# Patient Record
Sex: MALE | Race: BLACK OR AFRICAN AMERICAN | NOT HISPANIC OR LATINO | Employment: UNEMPLOYED | URBAN - METROPOLITAN AREA
[De-identification: names, ages, dates, MRNs, and addresses within clinical notes are randomized per-mention and may not be internally consistent; named-entity substitution may affect disease eponyms.]

---

## 2023-06-03 ENCOUNTER — HOSPITAL ENCOUNTER (EMERGENCY)
Facility: HOSPITAL | Age: 33
Discharge: HOME | End: 2023-06-03
Attending: EMERGENCY MEDICINE

## 2023-06-03 VITALS
SYSTOLIC BLOOD PRESSURE: 126 MMHG | OXYGEN SATURATION: 97 % | TEMPERATURE: 97.5 F | DIASTOLIC BLOOD PRESSURE: 65 MMHG | HEART RATE: 78 BPM | RESPIRATION RATE: 20 BRPM | WEIGHT: 211 LBS

## 2023-06-03 DIAGNOSIS — E11.65 TYPE 2 DIABETES MELLITUS WITH HYPERGLYCEMIA (HCC): Primary | ICD-10-CM

## 2023-06-03 LAB
ALBUMIN SERPL BCP-MCNC: 4.4 G/DL (ref 3.5–5)
ALP SERPL-CCNC: 62 U/L (ref 34–104)
ALT SERPL W P-5'-P-CCNC: 14 U/L (ref 7–52)
ANION GAP SERPL CALCULATED.3IONS-SCNC: 7 MMOL/L (ref 4–13)
ANISOCYTOSIS BLD QL SMEAR: PRESENT
AST SERPL W P-5'-P-CCNC: 14 U/L (ref 13–39)
BACTERIA UR QL AUTO: NORMAL /HPF
BASOPHILS # BLD MANUAL: 0 THOUSAND/UL (ref 0–0.1)
BASOPHILS NFR MAR MANUAL: 0 % (ref 0–1)
BILIRUB SERPL-MCNC: 0.31 MG/DL (ref 0.2–1)
BILIRUB UR QL STRIP: NEGATIVE
BUN SERPL-MCNC: 11 MG/DL (ref 5–25)
CALCIUM SERPL-MCNC: 9.6 MG/DL (ref 8.4–10.2)
CHLORIDE SERPL-SCNC: 98 MMOL/L (ref 96–108)
CLARITY UR: CLEAR
CO2 SERPL-SCNC: 25 MMOL/L (ref 21–32)
COLOR UR: COLORLESS
CREAT SERPL-MCNC: 0.92 MG/DL (ref 0.6–1.3)
EOSINOPHIL # BLD MANUAL: 0.1 THOUSAND/UL (ref 0–0.4)
EOSINOPHIL NFR BLD MANUAL: 2 % (ref 0–6)
ERYTHROCYTE [DISTWIDTH] IN BLOOD BY AUTOMATED COUNT: 12.5 % (ref 11.6–15.1)
GFR SERPL CREATININE-BSD FRML MDRD: 109 ML/MIN/1.73SQ M
GLUCOSE SERPL-MCNC: 361 MG/DL (ref 65–140)
GLUCOSE SERPL-MCNC: 373 MG/DL (ref 65–140)
GLUCOSE UR STRIP-MCNC: ABNORMAL MG/DL
HCT VFR BLD AUTO: 45.2 % (ref 36.5–49.3)
HGB BLD-MCNC: 14.7 G/DL (ref 12–17)
HGB UR QL STRIP.AUTO: NEGATIVE
KETONES UR STRIP-MCNC: NEGATIVE MG/DL
LEUKOCYTE ESTERASE UR QL STRIP: ABNORMAL
LYMPHOCYTES # BLD AUTO: 2.12 THOUSAND/UL (ref 0.6–4.47)
LYMPHOCYTES # BLD AUTO: 42 % (ref 14–44)
MCH RBC QN AUTO: 26.5 PG (ref 26.8–34.3)
MCHC RBC AUTO-ENTMCNC: 32.5 G/DL (ref 31.4–37.4)
MCV RBC AUTO: 81 FL (ref 82–98)
MONOCYTES # BLD AUTO: 0.15 THOUSAND/UL (ref 0–1.22)
MONOCYTES NFR BLD: 3 % (ref 4–12)
NEUTROPHILS # BLD MANUAL: 2.68 THOUSAND/UL (ref 1.85–7.62)
NEUTS SEG NFR BLD AUTO: 53 % (ref 43–75)
NITRITE UR QL STRIP: NEGATIVE
NON-SQ EPI CELLS URNS QL MICRO: NORMAL /HPF
PH UR STRIP.AUTO: 6 [PH]
PLATELET # BLD AUTO: 233 THOUSANDS/UL (ref 149–390)
PLATELET BLD QL SMEAR: ADEQUATE
PMV BLD AUTO: 10.6 FL (ref 8.9–12.7)
POIKILOCYTOSIS BLD QL SMEAR: PRESENT
POTASSIUM SERPL-SCNC: 3.8 MMOL/L (ref 3.5–5.3)
PROT SERPL-MCNC: 7.8 G/DL (ref 6.4–8.4)
PROT UR STRIP-MCNC: NEGATIVE MG/DL
RBC # BLD AUTO: 5.55 MILLION/UL (ref 3.88–5.62)
RBC #/AREA URNS AUTO: NORMAL /HPF
SODIUM SERPL-SCNC: 130 MMOL/L (ref 135–147)
SP GR UR STRIP.AUTO: 1.03 (ref 1–1.03)
UROBILINOGEN UR STRIP-ACNC: <2 MG/DL
WBC # BLD AUTO: 5.05 THOUSAND/UL (ref 4.31–10.16)
WBC #/AREA URNS AUTO: NORMAL /HPF

## 2023-06-03 PROCEDURE — 82948 REAGENT STRIP/BLOOD GLUCOSE: CPT

## 2023-06-03 PROCEDURE — 81001 URINALYSIS AUTO W/SCOPE: CPT | Performed by: PHYSICIAN ASSISTANT

## 2023-06-03 PROCEDURE — 80053 COMPREHEN METABOLIC PANEL: CPT | Performed by: PHYSICIAN ASSISTANT

## 2023-06-03 PROCEDURE — 36415 COLL VENOUS BLD VENIPUNCTURE: CPT | Performed by: PHYSICIAN ASSISTANT

## 2023-06-03 PROCEDURE — 85007 BL SMEAR W/DIFF WBC COUNT: CPT | Performed by: PHYSICIAN ASSISTANT

## 2023-06-03 PROCEDURE — 96360 HYDRATION IV INFUSION INIT: CPT

## 2023-06-03 PROCEDURE — 85027 COMPLETE CBC AUTOMATED: CPT | Performed by: PHYSICIAN ASSISTANT

## 2023-06-03 PROCEDURE — 99284 EMERGENCY DEPT VISIT MOD MDM: CPT

## 2023-06-03 RX ADMIN — SODIUM CHLORIDE 1000 ML: 0.9 INJECTION, SOLUTION INTRAVENOUS at 13:36

## 2023-06-03 NOTE — DISCHARGE INSTRUCTIONS
Please call your primary care provider to be seen in the office for reassessment and to resume your prescribed medications  Return to the ED with any new or worsening symptoms as discussed

## 2023-06-03 NOTE — ED PROVIDER NOTES
"History  Chief Complaint   Patient presents with   • Hyperglycemia - Symptomatic     Pt states he is a diabetic and believes his sugar is high  Pt states he is feeling \"gittery\"  Apurva Kim is a 50-year-old male arriving ambulatory to the emergency department for evaluation with concern for hyperglycemia  His past medical history includes type 2 diabetes  Patient reports that he has not recently followed up with his primary care provider and was previously prescribed Trulicity but has not taken this in at least 2 months  Patient reports that he feels generally tremulous/jittery, and also noticed a small amount of white penile discharge  He states that this was previously an indicator to him that his sugar was elevated so he proceeded to the emergency department for further evaluation and assessment  He does not routinely check his blood glucose but he had checked his blood glucose today which was in the 300s  Per review of the patient's EHR, prior glucose documented from an EDV in January was 205  Patient denies abdominal pain, nausea/vomiting/diarrhea, headache or vision change, chest pain, shortness of breath  He denies any fevers, chills, sweats  He denies any dysuria, urinary urgency or frequency  He reports resolution of penile discharge and denies concern for sexually transmitted infection  He denies any testicular pain or genital rashes  He has no history of diabetic ketoacidosis  He offers no other complaints or concerns at this time  None       Past Medical History:   Diagnosis Date   • Diabetes mellitus ([facility] [address] )        History reviewed  No pertinent surgical history  History reviewed  No pertinent family history  I have reviewed and agree with the history as documented      E-Cigarette/Vaping     E-Cigarette/Vaping Substances     Social History     Tobacco Use   • Smoking status: Never   • Smokeless tobacco: Never   Substance Use Topics   • Alcohol use: Not Currently   • Drug use: " Not Currently       Review of Systems   Constitutional: Negative for chills, diaphoresis and fever  Feeling jittery   Respiratory: Negative for shortness of breath  Cardiovascular: Negative for chest pain  Gastrointestinal: Negative for abdominal pain, nausea and vomiting  Genitourinary: Positive for penile discharge  Negative for difficulty urinating, dysuria, flank pain, hematuria, scrotal swelling and testicular pain  Skin: Negative for pallor  Neurological: Negative for dizziness, syncope, weakness and headaches  All other systems reviewed and are negative  Physical Exam  Physical Exam  Vitals and nursing note reviewed  Constitutional:       General: He is not in acute distress  Appearance: Normal appearance  He is well-developed  He is not ill-appearing, toxic-appearing or diaphoretic  HENT:      Head: Normocephalic and atraumatic  Right Ear: External ear normal       Left Ear: External ear normal       Mouth/Throat:      Mouth: Mucous membranes are moist    Eyes:      Conjunctiva/sclera: Conjunctivae normal    Cardiovascular:      Rate and Rhythm: Normal rate and regular rhythm  Pulses: Normal pulses  Pulmonary:      Effort: Pulmonary effort is normal  No respiratory distress  Breath sounds: Normal breath sounds  No wheezing, rhonchi or rales  Chest:      Chest wall: No tenderness  Abdominal:      General: There is no distension  Palpations: Abdomen is soft  Tenderness: There is no abdominal tenderness  Genitourinary:     Comments: Deferred  Musculoskeletal:         General: Normal range of motion  Cervical back: Normal range of motion and neck supple  Skin:     General: Skin is warm and dry  Capillary Refill: Capillary refill takes less than 2 seconds  Neurological:      Mental Status: He is alert  GCS: GCS eye subscore is 4  GCS verbal subscore is 5  GCS motor subscore is 6  Sensory: Sensation is intact        Motor: Motor function is intact  No weakness  Gait: Gait is intact  Psychiatric:         Mood and Affect: Mood normal          Vital Signs  ED Triage Vitals [06/03/23 1308]   Temperature Pulse Respirations Blood Pressure SpO2   97 5 °F (36 4 °C) 78 18 132/84 99 %      Temp Source Heart Rate Source Patient Position - Orthostatic VS BP Location FiO2 (%)   Temporal Monitor Sitting Left arm --      Pain Score       --           Vitals:    06/03/23 1308 06/03/23 1400   BP: 132/84 126/65   Pulse: 78 78   Patient Position - Orthostatic VS: Sitting Sitting         Visual Acuity      ED Medications  Medications   sodium chloride 0 9 % bolus 1,000 mL (0 mL Intravenous Stopped 6/3/23 1452)       Diagnostic Studies  Results Reviewed     Procedure Component Value Units Date/Time    Urine Microscopic [[id number]]  (Normal) Collected: 06/03/23 1444    Lab Status: Final result Specimen: Urine, Clean Catch Updated: 06/03/23 1506     RBC, UA None Seen /hpf      WBC, UA None Seen /hpf      Epithelial Cells Occasional /hpf      Bacteria, UA None Seen /hpf     UA w Reflex to Microscopic w Reflex to Culture [[id number]]  (Abnormal) Collected: 06/03/23 1444    Lab Status: Final result Specimen: Urine, Clean Catch Updated: 06/03/23 1504     Color, UA Colorless     Clarity, UA Clear     Specific Gravity, UA 1 026     pH, UA 6 0     Leukocytes, UA Elevated glucose may cause decreased leukocyte values   See urine microscopic for UWBC result     Nitrite, UA Negative     Protein, UA Negative mg/dl      Glucose, UA >=1000 (1%) mg/dl      Ketones, UA Negative mg/dl      Urobilinogen, UA <2 0 mg/dl      Bilirubin, UA Negative     Occult Blood, UA Negative    CBC and differential [[id number]]  (Abnormal) Collected: 06/03/23 1337    Lab Status: Final result Specimen: Blood from Arm, Right Updated: 06/03/23 1408     WBC 5 05 Thousand/uL      RBC 5 55 Million/uL      Hemoglobin 14 7 g/dL      Hematocrit 45 2 %      MCV 81 fL      MCH 26 5 pg      MCHC 32 5 g/dL      RDW 12 5 %      MPV 10 6 fL      Platelets 584 Thousands/uL     Narrative: This is an appended report  These results have been appended to a previously verified report      Manual Differential(PHLEBS Do Not Order) [[id number]]  (Abnormal) Collected: 06/03/23 1337    Lab Status: Final result Specimen: Blood from Arm, Right Updated: 06/03/23 1408     Segmented % 53 %      Lymphocytes % 42 %      Monocytes % 3 %      Eosinophils, % 2 %      Basophils % 0 %      Absolute Neutrophils 2 68 Thousand/uL      Lymphocytes Absolute 2 12 Thousand/uL      Monocytes Absolute 0 15 Thousand/uL      Eosinophils Absolute 0 10 Thousand/uL      Basophils Absolute 0 00 Thousand/uL      Total Counted --     Anisocytosis Present     Poikilocytes Present     Platelet Estimate Adequate    Comprehensive metabolic panel [[id number]]  (Abnormal) Collected: 06/03/23 1337    Lab Status: Final result Specimen: Blood from Arm, Right Updated: 06/03/23 1357     Sodium 130 mmol/L      Potassium 3 8 mmol/L      Chloride 98 mmol/L      CO2 25 mmol/L      ANION GAP 7 mmol/L      BUN 11 mg/dL      Creatinine 0 92 mg/dL      Glucose 373 mg/dL      Calcium 9 6 mg/dL      AST 14 U/L      ALT 14 U/L      Alkaline Phosphatase 62 U/L      Total Protein 7 8 g/dL      Albumin 4 4 g/dL      Total Bilirubin 0 31 mg/dL      eGFR 109 ml/min/1 73sq m     Narrative:      [place] guidelines for Chronic Kidney Disease (CKD):   •  Stage 1 with normal or high GFR (GFR > 90 mL/min/1 73 square meters)  •  Stage 2 Mild CKD (GFR = 60-89 mL/min/1 73 square meters)  •  Stage 3A Moderate CKD (GFR = 45-59 mL/min/1 73 square meters)  •  Stage 3B Moderate CKD (GFR = 30-44 mL/min/1 73 square meters)  •  Stage 4 Severe CKD (GFR = 15-29 mL/min/1 73 square meters)  •  Stage 5 End Stage CKD (GFR <15 mL/min/1 73 square meters)  Note: GFR calculation is accurate only with a steady state creatinine    Fingerstick Glucose (POCT) [[id number]] (Abnormal) Collected: 06/03/23 1311    Lab Status: Final result Updated: 06/03/23 1336     POC Glucose 361 mg/dl                  No orders to display              Procedures  Procedures         ED Course                               SBIRT 22yo+    Flowsheet Row Most Recent Value   Initial Alcohol Screen: US AUDIT-C     1  How often do you have a drink containing alcohol? 0 Filed at: 06/03/2023 1310   2  How many drinks containing alcohol do you have on a typical day you are drinking? 0 Filed at: 06/03/2023 1310   3a  Male UNDER 65: How often do you have five or more drinks on one occasion? 0 Filed at: 06/03/2023 1310   Audit-C Score 0 Filed at: 06/03/2023 1310   NITHIN: How many times in the past year have you    Used an illegal drug or used a prescription medication for non-medical reasons? Never Filed at: 06/03/2023 1310                    Medical Decision Making  MDM: This is a 29-year-old male with past medical history including diabetes presenting for evaluation with concern for hyperglycemia  Patient has not taken Trulicity in greater than 8 weeks as he has not followed up with his primary care doctor  He states that he has been feeling jittery, and had a brief episode of white penile discharge, which were indicators that his glucose has been elevated in the past   He otherwise has no systemic complaints  His vitals are stable on presentation, with examination as above  The patient is afebrile and nontoxic in appearance  His urinalysis is not consistent with urinary tract infection  He is not in DKA and does not require hospital admission for further management  Patient given IV fluids while being observed in the emergency department  We discussed all test results  Emphasized importance of outpatient primary care follow-up for further evaluation and management of his chronic condition  Parameters for ED return reviewed at length    Patient comfortable and agreeable with plan as above and was discharged in stable condition, ambulating independently from the emergency department today without issue  Amount and/or Complexity of Data Reviewed  Labs: ordered  Disposition  Final diagnoses:   Type 2 diabetes mellitus with hyperglycemia ([address] )     Time reflects when diagnosis was documented in both MDM as applicable and the Disposition within this note     Time User Action Codes Description Comment    6/3/2023  3:50 PM Vaibhav Finders Add [E11 65] Type 2 diabetes mellitus with hyperglycemia [facility])       ED Disposition     ED Disposition   Discharge    Condition   Stable    Date/Time   Sat Luis 3, 2023  3:49 PM    Comment   Rene More discharge to home/self care  Follow-up Information     Follow up With Specialties Details Why Contact Info Additional [address] [facility] Internal Medicine Internal Medicine   [address]  [facility] [address] [phone]  [phone] [facility] Internal Medicine, [address] [place], [place], [address]    Your PCP         [facility]  If symptoms worsen [address] Emergency Department, [address] [place] [address], [id number]          There are no discharge medications for this patient  No discharge procedures on file      PDMP Review     None          ED Provider  Electronically Signed by           Maryellen Fair PA-C  06/06/23 2006

## 2025-07-04 ENCOUNTER — HOSPITAL ENCOUNTER (EMERGENCY)
Facility: HOSPITAL | Age: 35
Discharge: HOME | End: 2025-07-04
Attending: EMERGENCY MEDICINE
Payer: MEDICAID

## 2025-07-04 VITALS
HEIGHT: 69 IN | OXYGEN SATURATION: 96 % | DIASTOLIC BLOOD PRESSURE: 81 MMHG | BODY MASS INDEX: 31.7 KG/M2 | HEART RATE: 90 BPM | TEMPERATURE: 97.6 F | WEIGHT: 214 LBS | SYSTOLIC BLOOD PRESSURE: 132 MMHG | RESPIRATION RATE: 16 BRPM

## 2025-07-04 DIAGNOSIS — T81.9XXA CIRCUMCISION COMPLICATION, INITIAL ENCOUNTER: ICD-10-CM

## 2025-07-04 DIAGNOSIS — T81.31XA WOUND DEHISCENCE, SURGICAL, INITIAL ENCOUNTER: Primary | ICD-10-CM

## 2025-07-04 LAB
ALBUMIN SERPL BCG-MCNC: 4.3 G/DL (ref 3.5–5)
ALP SERPL-CCNC: 57 U/L (ref 34–104)
ALT SERPL W P-5'-P-CCNC: 13 U/L (ref 7–52)
ANION GAP SERPL CALCULATED.3IONS-SCNC: 9 MMOL/L (ref 4–13)
AST SERPL W P-5'-P-CCNC: 11 U/L (ref 13–39)
BACTERIA UR QL AUTO: ABNORMAL /HPF
BASOPHILS # BLD AUTO: 0.02 THOUSANDS/ÂΜL (ref 0–0.1)
BASOPHILS NFR BLD AUTO: 1 % (ref 0–1)
BILIRUB SERPL-MCNC: 0.38 MG/DL (ref 0.2–1)
BILIRUB UR QL STRIP: NEGATIVE
BUN SERPL-MCNC: 13 MG/DL (ref 5–25)
CALCIUM SERPL-MCNC: 9.3 MG/DL (ref 8.4–10.2)
CHLORIDE SERPL-SCNC: 98 MMOL/L (ref 96–108)
CLARITY UR: CLEAR
CO2 SERPL-SCNC: 26 MMOL/L (ref 21–32)
COLOR UR: ABNORMAL
CREAT SERPL-MCNC: 0.79 MG/DL (ref 0.6–1.3)
EOSINOPHIL # BLD AUTO: 0.07 THOUSAND/ÂΜL (ref 0–0.61)
EOSINOPHIL NFR BLD AUTO: 2 % (ref 0–6)
ERYTHROCYTE [DISTWIDTH] IN BLOOD BY AUTOMATED COUNT: 12.6 % (ref 11.6–15.1)
GFR SERPL CREATININE-BSD FRML MDRD: 117 ML/MIN/1.73SQ M
GLUCOSE SERPL-MCNC: 357 MG/DL (ref 65–140)
GLUCOSE UR STRIP-MCNC: ABNORMAL MG/DL
HCT VFR BLD AUTO: 43.7 % (ref 36.5–49.3)
HGB BLD-MCNC: 14.4 G/DL (ref 12–17)
HGB UR QL STRIP.AUTO: ABNORMAL
IMM GRANULOCYTES # BLD AUTO: 0.01 THOUSAND/UL (ref 0–0.2)
IMM GRANULOCYTES NFR BLD AUTO: 0 % (ref 0–2)
KETONES UR STRIP-MCNC: NEGATIVE MG/DL
LEUKOCYTE ESTERASE UR QL STRIP: ABNORMAL
LYMPHOCYTES # BLD AUTO: 2.15 THOUSANDS/ÂΜL (ref 0.6–4.47)
LYMPHOCYTES NFR BLD AUTO: 52 % (ref 14–44)
MCH RBC QN AUTO: 26.1 PG (ref 26.8–34.3)
MCHC RBC AUTO-ENTMCNC: 33 G/DL (ref 31.4–37.4)
MCV RBC AUTO: 79 FL (ref 82–98)
MONOCYTES # BLD AUTO: 0.32 THOUSAND/ÂΜL (ref 0.17–1.22)
MONOCYTES NFR BLD AUTO: 8 % (ref 4–12)
NEUTROPHILS # BLD AUTO: 1.49 THOUSANDS/ÂΜL (ref 1.85–7.62)
NEUTS SEG NFR BLD AUTO: 37 % (ref 43–75)
NITRITE UR QL STRIP: NEGATIVE
NON-SQ EPI CELLS URNS QL MICRO: ABNORMAL /HPF
NRBC BLD AUTO-RTO: 0 /100 WBCS
PH UR STRIP.AUTO: 5.5 [PH]
PLATELET # BLD AUTO: 190 THOUSANDS/UL (ref 149–390)
PMV BLD AUTO: 10.2 FL (ref 8.9–12.7)
POTASSIUM SERPL-SCNC: 3.8 MMOL/L (ref 3.5–5.3)
PROT SERPL-MCNC: 7.1 G/DL (ref 6.4–8.4)
PROT UR STRIP-MCNC: NEGATIVE MG/DL
RBC # BLD AUTO: 5.51 MILLION/UL (ref 3.88–5.62)
RBC #/AREA URNS AUTO: ABNORMAL /HPF
SODIUM SERPL-SCNC: 133 MMOL/L (ref 135–147)
SP GR UR STRIP.AUTO: 1.05 (ref 1–1.03)
UROBILINOGEN UR STRIP-ACNC: <2 MG/DL
WBC # BLD AUTO: 4.06 THOUSAND/UL (ref 4.31–10.16)
WBC #/AREA URNS AUTO: ABNORMAL /HPF

## 2025-07-04 PROCEDURE — 99283 EMERGENCY DEPT VISIT LOW MDM: CPT

## 2025-07-04 PROCEDURE — 36415 COLL VENOUS BLD VENIPUNCTURE: CPT | Performed by: PHYSICIAN ASSISTANT

## 2025-07-04 PROCEDURE — 85025 COMPLETE CBC W/AUTO DIFF WBC: CPT | Performed by: PHYSICIAN ASSISTANT

## 2025-07-04 PROCEDURE — 99284 EMERGENCY DEPT VISIT MOD MDM: CPT | Performed by: PHYSICIAN ASSISTANT

## 2025-07-04 PROCEDURE — 81001 URINALYSIS AUTO W/SCOPE: CPT | Performed by: PHYSICIAN ASSISTANT

## 2025-07-04 PROCEDURE — 80053 COMPREHEN METABOLIC PANEL: CPT | Performed by: PHYSICIAN ASSISTANT

## 2025-07-04 NOTE — ED PROVIDER NOTES
Time reflects when diagnosis was documented in both MDM as applicable and the Disposition within this note       Time User Action Codes Description Comment    7/4/2025  7:03 AM Anant Kamara Add [T81.31XA] Wound dehiscence, surgical, initial encounter     7/4/2025  7:03 AM Anant Kamara Add [T81.9XXA] Circumcision complication, initial encounter           ED Disposition       ED Disposition   Discharge    Condition   Stable    Date/Time   Fri Jul 4, 2025  7:03 AM    Comment   Linden Luis discharge to home/self care.                   Assessment & Plan       Medical Decision Making  35-year-old male presenting to the emerged from today for evaluation of wound dehiscence of his penis after having a circumcision done 2 weeks ago by an outside facility in [place].  States he was bending over to lift up something and had bleeding from the ventral aspect of the penis where the sutures, absorbable sutures, opened up.  Hemostasis achieved upon arrival to the ED without any active hemorrhage.  He states he did lose blood though and has is concerned associated with this.  He did not call his urologist prior to coming in.  He is hemodynamically stable on examination and there is no active hemorrhage upon my evaluation.  The wound was cleaned and Xeroform gauze was placed in the area as well as dry gauze pad and tape.  No signs of urethral injury.  There was a few sutures that opened that were absorbable to the ventral aspect of the penis.  Discussed this case with urologist on-call who recommends him follow-up with his urologist and do not need for acute intervention by them at this time.  Strict ED return precautions were discussed with him.  Multiple reevaluations in the ED and no repetitive bleeding or active hemorrhage.  Patient will call his urologist today for follow-up appointment.    Amount and/or Complexity of Data Reviewed  Labs: ordered. Decision-making details documented in ED Course.  Discussion of management or  test interpretation with external provider(s): Dr Horton and KEVAN in urology [place] who agrees with treatment plan and disposition          ED Course as of 07/04/25 0915   Fri Jul 04, 2025   0703 Hemoglobin: 14.4   0703 Hematocrit: 43.7   0730 Discussed with [facility] u[facility] [place] Neeta Smith and she reports nothing to do from their end and pt cleared by them to follow up with his urology team in [place] for management. Recommends doing xeroform gauze and wrap. Advised pt wound care instructions and pcp to follow up with him as well and for him to call his urologist today for an apt. Advised the wound is open and he is a diabetic and to have better BS control to avoid infx. CBC without leukocytosis or left shift. ANC mildly low- needs f.u. H&H stable without anemia. No CAMILA on CMP. Hyperglycemia at 357- stressed important of BS control and wound healing and infeciton risk and he verbalized understanding. Circumcision wound observed here without any bleeding. Wound dehiscence noted at the ventral aspect. Urethra patent. Stitch ruptured from bearing down to lift something. Advised NO heavy lifting or strenuous activity. He verbalized understanding. STRICT ed return precautions discussed at bedside and he and family verbalized understanding.    0759 The penis was wrapped with Xeroform gauze as well as wrap.  Advised patient localized wound care.  Stressed to him the importance of hyperglycemia control to prevent infection and poor sequela as well as to help with wound healing.  Advised to call his urologist to do the procedure today for further follow-up and management outpatient, and to see if he can be seen today for evaluation of this wound.  No active hemorrhage upon discharge, hemodynamically stable.  Labs reassuring without anemia.  ED return precautions were discussed with him and family at bedside he verbalized understanding.  Urethra altagracia patent and he is able to urinate on his own and there is no signs of  urethral injury or urinary retention here.   0809 UA w Reflex to Microscopic w Reflex to Culture(!)  Secondary signs dehydration with elevated specific gravity as well as glucose in the urine patient is a known diabetic.  Advise increase fluid hydration.  There is a large amount of blood in the urine, however patient states that the wound did trip blood into the urine cup, on my exam of the urine sample with direct inspection there was clear urine with a few drops of blood in it and he states that that did not come from the urethra that came from the wound site.  Reassuring no urinary retention able to control when urine passage.  Advise ED return precautions.  If any urinary retention occurs to return back to the ED.   0822 Epithelial Cells: None Seen   0822 Bacteria, UA: None Seen   0823 Call your urologist today for a appointment to be seen and have the wound evaluated.  Your blood sugar was elevated here, this will complicate the illness as well as prevent wound healing and risk for infection has increased.  If the bleeding reoccurs, return to the emergency department.  Keep the area clean.  With the sutures that have opened up, this dehiscence of the wound will cause a bigger scar and needs to get closely examined by her urologist today in [place].  If any new or worsening symptoms occur return to the emergency department   0831 Patient was reevaluated again at this time, wound is reexamined, no bleeding or oozing through the dressing.  Hemostasis achieved with pressure.  Xeroform gauze applied.  Advised him again to follow-up with the family care provider and hyperglycemia complicating this and he verbalized understanding.       Medications - No data to display    ED Risk Strat Scores                    No data recorded        SBIRT 20yo+      Flowsheet Row Most Recent Value   Initial Alcohol Screen: US AUDIT-C     1. How often do you have a drink containing alcohol? 0 Filed at: 07/04/2025 0605   2. How many  "drinks containing alcohol do you have on a typical day you are drinking?  0 Filed at: 07/04/2025 0629   3a. Male UNDER 65: How often do you have five or more drinks on one occasion? 0 Filed at: 07/04/2025 0629   Audit-C Score 0 Filed at: 07/04/2025 0629   NITHIN: How many times in the past year have you...    Used an illegal drug or used a prescription medication for non-medical reasons? Never Filed at: 07/04/2025 0629                            History of Present Illness       Chief Complaint   Patient presents with    Penis Injury     Patient reports bleeding from his penis that started this morning. Patient reports \"I was circumcised 2 weeks ago and this morning I woke up and dropped something on the floor and bent down to get it and heard and felt a pop on my penis and then blood was everywhere.\"        Past Medical History[1]   Past Surgical History[2]   Family History[3]   Social History[4]   E-Cigarette/Vaping    E-Cigarette Use Never User       E-Cigarette/Vaping Substances      I have reviewed and agree with the history as documented.       History provided by:  Patient, medical records and spouse   used: No    Wound Check   Treated in ED: had circumcision 2 weeks ago by [place] u[facility]. Previous treatment in the ED includes Wound cleansing or irrigation and laceration repair. There has been bloody (as of today) discharge from the wound. There is no redness present. There is no swelling present. There is no pain present.       Review of Systems   Constitutional:  Negative for chills and fever.   Respiratory:  Negative for chest tightness and shortness of breath.    Cardiovascular:  Negative for chest pain.   Skin:  Positive for wound.   All other systems reviewed and are negative.          Objective       ED Triage Vitals [07/04/25 0627]   Temperature Pulse Blood Pressure Respirations SpO2 Patient Position - Orthostatic VS   97.6 °F (36.4 °C) 90 132/81 16 96 % Sitting      Temp Source Heart " Rate Source BP Location FiO2 (%) Pain Score    Temporal Monitor Right arm -- --      Vitals      Date and Time Temp Pulse SpO2 Resp BP Pain Score FACES Pain Rating User   07/04/25 0627 97.6 °F (36.4 °C) 90 96 % 16 132/81 -- -- JT            Physical Exam  Vitals and nursing note reviewed.   Constitutional:       General: He is not in acute distress.     Appearance: He is well-developed.   HENT:      Head: Normocephalic and atraumatic.     Eyes:      Conjunctiva/sclera: Conjunctivae normal.       Cardiovascular:      Rate and Rhythm: Normal rate and regular rhythm.   Pulmonary:      Effort: Pulmonary effort is normal. No respiratory distress.      Breath sounds: Normal breath sounds.   Abdominal:      Palpations: Abdomen is soft.      Tenderness: There is no abdominal tenderness.   Genitourinary:       Comments: Wound dehiscence of the ventral aspect without foreign body or deep lacerations.   No urethral injury  No pain on palpations  Testicles non tender      Musculoskeletal:         General: No swelling.      Cervical back: Normal range of motion and neck supple.     Skin:     General: Skin is warm and dry.      Capillary Refill: Capillary refill takes less than 2 seconds.     Neurological:      Mental Status: He is alert.     Psychiatric:         Mood and Affect: Mood normal.         Results Reviewed       Procedure Component Value Units Date/Time    Urine Microscopic [[id number]]  (Abnormal) Collected: 07/04/25 0746    Lab Status: Final result Specimen: Urine, Clean Catch Updated: 07/04/25 0811     RBC, UA Innumerable /hpf      WBC, UA 4-10 /hpf      Epithelial Cells None Seen /hpf      Bacteria, UA None Seen /hpf     UA w Reflex to Microscopic w Reflex to Culture [[id number]]  (Abnormal) Collected: 07/04/25 0746    Lab Status: Final result Specimen: Urine, Clean Catch Updated: 07/04/25 0809     Color, UA Light Yellow     Clarity, UA Clear     Specific Gravity, UA 1.047     pH, UA 5.5     Leukocytes, UA Elevated  glucose may cause decreased leukocyte values. See urine microscopic for UWBC result     Nitrite, UA Negative     Protein, UA Negative mg/dl      Glucose, UA >=1000 (1%) mg/dl      Ketones, UA Negative mg/dl      Urobilinogen, UA <2.0 mg/dl      Bilirubin, UA Negative     Occult Blood, UA Large    Comprehensive metabolic panel [[id number]]  (Abnormal) Collected: 07/04/25 0652    Lab Status: Final result Specimen: Blood from Arm, Right Updated: 07/04/25 0717     Sodium 133 mmol/L      Potassium 3.8 mmol/L      Chloride 98 mmol/L      CO2 26 mmol/L      ANION GAP 9 mmol/L      BUN 13 mg/dL      Creatinine 0.79 mg/dL      Glucose 357 mg/dL      Calcium 9.3 mg/dL      AST 11 U/L      ALT 13 U/L      Alkaline Phosphatase 57 U/L      Total Protein 7.1 g/dL      Albumin 4.3 g/dL      Total Bilirubin 0.38 mg/dL      eGFR 117 ml/min/1.73sq m     Narrative:      [employer] guidelines for Chronic Kidney Disease (CKD):     Stage 1 with normal or high GFR (GFR > 90 mL/min/1.73 square meters)    Stage 2 Mild CKD (GFR = 60-89 mL/min/1.73 square meters)    Stage 3A Moderate CKD (GFR = 45-59 mL/min/1.73 square meters)    Stage 3B Moderate CKD (GFR = 30-44 mL/min/1.73 square meters)    Stage 4 Severe CKD (GFR = 15-29 mL/min/1.73 square meters)    Stage 5 End Stage CKD (GFR <15 mL/min/1.73 square meters)  Note: GFR calculation is accurate only with a steady state creatinine    CBC and differential [[id number]]  (Abnormal) Collected: 07/04/25 0652    Lab Status: Final result Specimen: Blood from Arm, Right Updated: 07/04/25 0703     WBC 4.06 Thousand/uL      RBC 5.51 Million/uL      Hemoglobin 14.4 g/dL      Hematocrit 43.7 %      MCV 79 fL      MCH 26.1 pg      MCHC 33.0 g/dL      RDW 12.6 %      MPV 10.2 fL      Platelets 190 Thousands/uL      nRBC 0 /100 WBCs      Segmented % 37 %      Immature Grans % 0 %      Lymphocytes % 52 %      Monocytes % 8 %      Eosinophils Relative 2 %      Basophils Relative 1 %       Absolute Neutrophils 1.49 Thousands/µL      Absolute Immature Grans 0.01 Thousand/uL      Absolute Lymphocytes 2.15 Thousands/µL      Absolute Monocytes 0.32 Thousand/µL      Eosinophils Absolute 0.07 Thousand/µL      Basophils Absolute 0.02 Thousands/µL             No orders to display       Procedures    ED Medication and Procedure Management   None     There are no discharge medications for this patient.    No discharge procedures on file.  ED SEPSIS DOCUMENTATION   Time reflects when diagnosis was documented in both MDM as applicable and the Disposition within this note       Time User Action Codes Description Comment    7/4/2025  7:03 AM Anant Kamara Add [T81.31XA] Wound dehiscence, surgical, initial encounter     7/4/2025  7:03 AM Anant Kamara Add [T81.9XXA] Circumcision complication, initial encounter                    [1]   Past Medical History:  Diagnosis Date    Asthma     Diabetes mellitus (HCC)    [2]   Past Surgical History:  Procedure Laterality Date    CIRCUMCISION     [3] No family history on file.  [4]   Social History  Tobacco Use    Smoking status: Never    Smokeless tobacco: Never   Vaping Use    Vaping status: Never Used   Substance Use Topics    Alcohol use: Not Currently    Drug use: Not Currently        Anant Kamara PA-C  07/04/25 0915

## 2025-07-04 NOTE — DISCHARGE INSTRUCTIONS
Call your urologist today for a appointment to be seen and have the wound evaluated.  Your blood sugar was elevated here, this will complicate the illness as well as prevent wound healing and risk for infection has increased.  If the bleeding reoccurs, return to the emergency department.  Keep the area clean.  With the sutures that have opened up, this dehiscence of the wound will cause a bigger scar and needs to get closely examined by her urologist today in [place].  If any new or worsening symptoms occur return to the emergency department